# Patient Record
Sex: FEMALE | Race: WHITE | URBAN - METROPOLITAN AREA
[De-identification: names, ages, dates, MRNs, and addresses within clinical notes are randomized per-mention and may not be internally consistent; named-entity substitution may affect disease eponyms.]

---

## 2018-09-20 ENCOUNTER — OFFICE VISIT - HEALTHEAST (OUTPATIENT)
Dept: FAMILY MEDICINE | Facility: CLINIC | Age: 23
End: 2018-09-20

## 2018-09-20 DIAGNOSIS — Z00.00 HEALTH CARE MAINTENANCE: ICD-10-CM

## 2018-09-20 DIAGNOSIS — Z72.0 TOBACCO ABUSE: ICD-10-CM

## 2018-09-20 DIAGNOSIS — Z11.3 SCREEN FOR STD (SEXUALLY TRANSMITTED DISEASE): ICD-10-CM

## 2018-09-20 LAB
ANION GAP SERPL CALCULATED.3IONS-SCNC: 10 MMOL/L (ref 5–18)
BUN SERPL-MCNC: 10 MG/DL (ref 8–22)
CALCIUM SERPL-MCNC: 10.1 MG/DL (ref 8.5–10.5)
CHLORIDE BLD-SCNC: 103 MMOL/L (ref 98–107)
CHOLEST SERPL-MCNC: 200 MG/DL
CO2 SERPL-SCNC: 25 MMOL/L (ref 22–31)
CREAT SERPL-MCNC: 0.8 MG/DL (ref 0.6–1.1)
ERYTHROCYTE [DISTWIDTH] IN BLOOD BY AUTOMATED COUNT: 11.2 % (ref 11–14.5)
FASTING STATUS PATIENT QL REPORTED: YES
GFR SERPL CREATININE-BSD FRML MDRD: >60 ML/MIN/1.73M2
GLUCOSE BLD-MCNC: 93 MG/DL (ref 70–125)
HCT VFR BLD AUTO: 39.6 % (ref 35–47)
HDLC SERPL-MCNC: 73 MG/DL
HGB BLD-MCNC: 13.4 G/DL (ref 12–16)
HIV 1+2 AB+HIV1 P24 AG SERPL QL IA: NEGATIVE
LDLC SERPL CALC-MCNC: 109 MG/DL
MCH RBC QN AUTO: 31.4 PG (ref 27–34)
MCHC RBC AUTO-ENTMCNC: 33.9 G/DL (ref 32–36)
MCV RBC AUTO: 93 FL (ref 80–100)
PLATELET # BLD AUTO: 288 THOU/UL (ref 140–440)
PMV BLD AUTO: 8.2 FL (ref 7–10)
POTASSIUM BLD-SCNC: 4.2 MMOL/L (ref 3.5–5)
RBC # BLD AUTO: 4.27 MILL/UL (ref 3.8–5.4)
SODIUM SERPL-SCNC: 138 MMOL/L (ref 136–145)
TRIGL SERPL-MCNC: 90 MG/DL
TSH SERPL DL<=0.005 MIU/L-ACNC: 1.25 UIU/ML (ref 0.3–5)
WBC: 7.3 THOU/UL (ref 4–11)

## 2018-09-20 ASSESSMENT — MIFFLIN-ST. JEOR: SCORE: 1588.43

## 2018-09-21 LAB
25(OH)D3 SERPL-MCNC: 29.8 NG/ML (ref 30–80)
C TRACH DNA SPEC QL PROBE+SIG AMP: NEGATIVE
HAV IGM SERPL QL IA: NEGATIVE
HBV CORE IGM SERPL QL IA: NEGATIVE
HBV SURFACE AG SERPL QL IA: NEGATIVE
HCV AB SERPL QL IA: NEGATIVE
HPV SOURCE: ABNORMAL
HUMAN PAPILLOMA VIRUS 16 DNA: NEGATIVE
HUMAN PAPILLOMA VIRUS 18 DNA: NEGATIVE
HUMAN PAPILLOMA VIRUS FINAL DIAGNOSIS: ABNORMAL
HUMAN PAPILLOMA VIRUS OTHER HR: POSITIVE
N GONORRHOEA DNA SPEC QL NAA+PROBE: NEGATIVE
SPECIMEN DESCRIPTION: ABNORMAL
T PALLIDUM AB SER QL: NEGATIVE

## 2018-09-24 ENCOUNTER — COMMUNICATION - HEALTHEAST (OUTPATIENT)
Dept: PEDIATRICS | Facility: CLINIC | Age: 23
End: 2018-09-24

## 2018-10-01 LAB
BKR LAB AP ABNORMAL BLEEDING: NO
BKR LAB AP BIRTH CONTROL/HORMONES: NORMAL
BKR LAB AP CERVICAL APPEARANCE: NORMAL
BKR LAB AP GYN ADEQUACY: NORMAL
BKR LAB AP GYN INTERPRETATION: NORMAL
BKR LAB AP HPV REFLEX: NORMAL
BKR LAB AP LMP: NORMAL
BKR LAB AP PATIENT STATUS: NORMAL
BKR LAB AP PREVIOUS ABNORMAL: NORMAL
BKR LAB AP PREVIOUS NORMAL: 2011
HIGH RISK?: NO
PATH REPORT.COMMENTS IMP SPEC: NORMAL
RESULT FLAG (HE HISTORICAL CONVERSION): NORMAL

## 2018-10-03 ENCOUNTER — COMMUNICATION - HEALTHEAST (OUTPATIENT)
Dept: FAMILY MEDICINE | Facility: CLINIC | Age: 23
End: 2018-10-03

## 2018-11-17 ENCOUNTER — OFFICE VISIT (OUTPATIENT)
Dept: URGENT CARE | Facility: URGENT CARE | Age: 23
End: 2018-11-17
Payer: COMMERCIAL

## 2018-11-17 VITALS
DIASTOLIC BLOOD PRESSURE: 66 MMHG | BODY MASS INDEX: 28.12 KG/M2 | HEIGHT: 66 IN | TEMPERATURE: 98.5 F | RESPIRATION RATE: 14 BRPM | HEART RATE: 90 BPM | WEIGHT: 175 LBS | SYSTOLIC BLOOD PRESSURE: 118 MMHG

## 2018-11-17 DIAGNOSIS — R07.0 THROAT PAIN: Primary | ICD-10-CM

## 2018-11-17 LAB
DEPRECATED S PYO AG THROAT QL EIA: NORMAL
SPECIMEN SOURCE: NORMAL

## 2018-11-17 PROCEDURE — 99202 OFFICE O/P NEW SF 15 MIN: CPT | Performed by: FAMILY MEDICINE

## 2018-11-17 PROCEDURE — 87081 CULTURE SCREEN ONLY: CPT | Performed by: FAMILY MEDICINE

## 2018-11-17 PROCEDURE — 87880 STREP A ASSAY W/OPTIC: CPT | Performed by: FAMILY MEDICINE

## 2018-11-17 RX ORDER — AMOXICILLIN 500 MG/1
500 CAPSULE ORAL 3 TIMES DAILY
Qty: 30 CAPSULE | Refills: 0 | Status: SHIPPED | OUTPATIENT
Start: 2018-11-17

## 2018-11-17 NOTE — PROGRESS NOTES
Subjective: Patient is leaving for hospitals tomorrow, yesterday morning suddenly got sick with a sore throat and fevers and feeling exhausted.  No cold symptoms.  She has never had mono.    Objective: ENT with red and exudative tonsils.  Anterior nodes are present and painful on the right.  Lungs are clear.  Heart is regular without murmurs.  Abdomen benign without rash.  Strep test negative    Assessment and plan: This feels just like strep to her and she has all the classic symptoms and yet the test is negative.  It may be a false negative.  It is complicated by the fact that she is leaving for hospitals for 2 weeks.  She will sign up for MY CHART, so she can see the results of the culture tomorrow or the next day and will start her on amoxicillin pending culture but she will stop it if the test is negative in the culture.

## 2018-11-17 NOTE — MR AVS SNAPSHOT
"              After Visit Summary   11/17/2018    Ema Lynn    MRN: [id number]           Patient Information     Date Of Birth          1995        Visit Information        Provider Department      11/17/2018 9:25 AM Isaiah Riddle MD [facility]        Today's Diagnoses     Throat pain    -  1       Follow-ups after your visit        Who to contact     If you have questions or need follow up information about today's clinic visit or your schedule please contact [facility] directly at [phone].  Normal or non-critical lab and imaging results will be communicated to you by [employer], letter or phone within 4 business days after the clinic has received the results. If you do not hear from us within 7 days, please contact the clinic through [employer] or phone. If you have a critical or abnormal lab result, we will notify you by phone as soon as possible.  Submit refill requests through [employer] or call your pharmacy and they will forward the refill request to us. Please allow 3 business days for your refill to be completed.          Additional Information About Your Visit        MyChart Information     [employer] gives you secure access to your electronic health record. If you see a primary care provider, you can also send messages to your care team and make appointments. If you have questions, please call your primary care clinic.  If you do not have a primary care provider, please call [phone] and they will assist you.        Care EveryWhere ID     This is your Care EveryWhere ID. This could be used by other organizations to access your [facility] medical records  [id number]        Your Vitals Were     Pulse Temperature Respirations Height Last Period Breastfeeding?    90 98.5  F (36.9  C) (Oral) 14 5' 6\" (1.676 m) 11/14/2018 No    BMI (Body Mass Index)                   28.25 kg/m2            Blood Pressure from Last 3 Encounters:   11/17/18 118/66    " Weight from Last 3 Encounters:   11/17/18 175 lb (79.4 kg)              We Performed the Following     Beta strep group A culture     Strep, Rapid Screen          Today's Medication Changes          These changes are accurate as of 11/17/18 10:52 AM.  If you have any questions, ask your nurse or doctor.               Start taking these medicines.        Dose/Directions    amoxicillin 500 MG capsule   Commonly known as:  AMOXIL   Used for:  Throat pain   Started by:  Isaiah Riddle MD        Dose:  500 mg   Take 1 capsule (500 mg) by mouth 3 times daily   Quantity:  30 capsule   Refills:  0            Where to get your medicines      These medications were sent to [facility] - [place], [place] - [address]  [address] [facility] [place] [address]     Phone:  [phone]     amoxicillin 500 MG capsule                Primary Care Provider Fax #    Physician No Ref-Primary [phone]       No address on file        Equal Access to Services     BALJIT MASON : Hadstephy wang Sostefani, waaxda luqadaha, qaybta kaalmada adecornellyada, hilary pollard . So [facility] [phone].    ATENCIÓN: Si habla español, tiene a hannon disposición servicios gratuitos de asistencia lingüística. Llame al [phone].    We comply with applicable federal civil rights laws and [place] laws. We do not discriminate on the basis of race, color, national origin, age, disability, sex, sexual orientation, or gender identity.            Thank you!     Thank you for choosing [facility]  for your care. Our goal is always to provide you with excellent care. Hearing back from our patients is one way we can continue to improve our services. Please take a few minutes to complete the written survey that you may receive in the mail after your visit with us. Thank you!             Your Updated Medication List - Protect others around you: Learn how to safely use, store and throw away  your medicines at www.disposemymeds.org.          This list is accurate as of 11/17/18 10:52 AM.  Always use your most recent med list.                   Brand Name Dispense Instructions for use Diagnosis    amoxicillin 500 MG capsule    AMOXIL    30 capsule    Take 1 capsule (500 mg) by mouth 3 times daily    Throat pain

## 2018-11-18 LAB
BACTERIA SPEC CULT: NORMAL
SPECIMEN SOURCE: NORMAL

## 2018-11-20 ENCOUNTER — HEALTH MAINTENANCE LETTER (OUTPATIENT)
Age: 23
End: 2018-11-20

## 2019-01-02 ENCOUNTER — COMMUNICATION - HEALTHEAST (OUTPATIENT)
Dept: SCHEDULING | Facility: CLINIC | Age: 24
End: 2019-01-02

## 2019-01-02 ENCOUNTER — OFFICE VISIT - HEALTHEAST (OUTPATIENT)
Dept: FAMILY MEDICINE | Facility: CLINIC | Age: 24
End: 2019-01-02

## 2019-01-02 DIAGNOSIS — N39.0 ACUTE URINARY TRACT INFECTION: ICD-10-CM

## 2019-01-02 LAB
ALBUMIN UR-MCNC: ABNORMAL MG/DL
APPEARANCE UR: ABNORMAL
BACTERIA #/AREA URNS HPF: ABNORMAL HPF
BILIRUB UR QL STRIP: NEGATIVE
COLOR UR AUTO: ABNORMAL
GLUCOSE UR STRIP-MCNC: NEGATIVE MG/DL
HGB UR QL STRIP: ABNORMAL
KETONES UR STRIP-MCNC: NEGATIVE MG/DL
LEUKOCYTE ESTERASE UR QL STRIP: ABNORMAL
MUCOUS THREADS #/AREA URNS LPF: ABNORMAL LPF
NITRATE UR QL: NEGATIVE
PH UR STRIP: 5.5 [PH] (ref 5–8)
RBC #/AREA URNS AUTO: >100 HPF
SP GR UR STRIP: >=1.03 (ref 1–1.03)
SQUAMOUS #/AREA URNS AUTO: ABNORMAL LPF
UROBILINOGEN UR STRIP-ACNC: ABNORMAL
WBC #/AREA URNS AUTO: ABNORMAL HPF

## 2019-01-02 ASSESSMENT — MIFFLIN-ST. JEOR: SCORE: 1596.14

## 2019-01-05 ENCOUNTER — AMBULATORY - HEALTHEAST (OUTPATIENT)
Dept: FAMILY MEDICINE | Facility: CLINIC | Age: 24
End: 2019-01-05

## 2019-01-05 LAB
BACTERIA SPEC CULT: ABNORMAL
BACTERIA SPEC CULT: ABNORMAL

## 2019-01-07 ENCOUNTER — COMMUNICATION - HEALTHEAST (OUTPATIENT)
Dept: FAMILY MEDICINE | Facility: CLINIC | Age: 24
End: 2019-01-07

## 2019-09-13 ENCOUNTER — OFFICE VISIT - HEALTHEAST (OUTPATIENT)
Dept: FAMILY MEDICINE | Facility: CLINIC | Age: 24
End: 2019-09-13

## 2019-09-13 DIAGNOSIS — L72.3 SEBACEOUS CYST: ICD-10-CM

## 2019-09-13 DIAGNOSIS — L73.2 HYDRADENITIS: ICD-10-CM

## 2020-03-10 ENCOUNTER — HEALTH MAINTENANCE LETTER (OUTPATIENT)
Age: 25
End: 2020-03-10

## 2020-12-27 ENCOUNTER — HEALTH MAINTENANCE LETTER (OUTPATIENT)
Age: 25
End: 2020-12-27

## 2021-04-24 ENCOUNTER — HEALTH MAINTENANCE LETTER (OUTPATIENT)
Age: 26
End: 2021-04-24

## 2021-06-01 VITALS — BODY MASS INDEX: 28.33 KG/M2 | WEIGHT: 180.5 LBS | HEIGHT: 67 IN

## 2021-06-01 NOTE — PATIENT INSTRUCTIONS - HE
Could get pregnant - if not using birth control then you should take daily prenatal vitamin     Cyst  -looks to be healing well  -infection is resolving - normal fluid now  -reasonable to let this heal and if it ever comes back again then we could discuss plan for surgical removal    Armpits - looks like hydradenitis vs folliculitis  -will treat with topical clindamycin (antibiotic) two times a day for 3 months

## 2021-06-01 NOTE — PROGRESS NOTES
Assessment/Plan:    1. Hydradenitis  Current appearance of axillary rash looks more like folliculitis but described prior appearance sounds like hydradenitis. Will treat with clindamycin solution two times a day for 3 months - if not working would try alternative treatment.  - clindamycin (CLEOCIN T) 1 % external solution; Apply to affected area 2 times daily (armpits)  Dispense: 60 mL; Refill: 1    2. Sebaceous cyst  Popped sebaceous cyst on right upper arm without evidence of infection at this time. No need for antibiotics at this time and would not recommend excision currently. Discussed letting current lesion heal and if ever occurs again we could discuss plan for excision in the future.      Follow up: as needed    Michelle Ramirez MD  [facility]    Subjective:    Patient ID: Ema Lynn is a 24 y.o. female is here today for cyst, rash    Rash, cyst  -in Feb woke up and noticed inflammation/irritation under her armpits - hasn't really gone away since then  -feels like hair follicles are infected - gets whiteheads/boils  -worse with heat/humidity  -has tried apple cider vinegar - unsure if helpful; also tried to wear loose clothes  -no fever or feeling ill though has been feeling fatigued  -a few days ago had a cyst pop on her R lateral arm - initially with lots of pus but now just clear fluid - not very painful - wondering if it needs treatment      Patient Active Problem List   Diagnosis     Vitamin D deficiency     History of HPV infection     History reviewed. No pertinent surgical history.  No current outpatient medications on file prior to visit.     No current facility-administered medications on file prior to visit.      No Known Allergies  Social History     Socioeconomic History     Marital status: Single     Spouse name: Not on file     Number of children: 0     Years of education: Not on file     Highest education level: Not on file   Occupational History     Occupation:  /   Social Needs     Financial resource strain: Not on file     Food insecurity:     Worry: Not on file     Inability: Not on file     Transportation needs:     Medical: Not on file     Non-medical: Not on file   Tobacco Use     Smoking status: Former Smoker     Types: Cigarettes     Smokeless tobacco: Never Used   Substance and Sexual Activity     Alcohol use: Yes     Frequency: 4 or more times a week     Drinks per session: 1 or 2     Binge frequency: Never     Comment: 8-10 drinks/week     Drug use: No     Sexual activity: Yes     Partners: Male     Birth control/protection: None   Lifestyle     Physical activity:     Days per week: Not on file     Minutes per session: Not on file     Stress: Not on file   Relationships     Social connections:     Talks on phone: Not on file     Gets together: Not on file     Attends Caodaism service: Not on file     Active member of club or organization: Not on file     Attends meetings of clubs or organizations: Not on file     Relationship status: Not on file     Intimate partner violence:     Fear of current or ex partner: Not on file     Emotionally abused: Not on file     Physically abused: Not on file     Forced sexual activity: Not on file   Other Topics Concern     Not on file   Social History Narrative     Not on file     Family History   Problem Relation Age of Onset     Heart disease Mother      Other Mother         HPV positive     Asthma Mother      Diabetes Father         type 2     Polycystic ovary syndrome Sister      ADD / ADHD Brother      Polycystic ovary syndrome Sister      Review of systems is as stated in HPI, and the remainder of system review is otherwise negative.    Objective:      /60   Pulse 81   Wt 212 lb 5 oz (96.3 kg)   LMP 07/31/2019 (Approximate)   BMI 33.75 kg/m      General appearance: awake, NAD, obese  HEENT: atraumatic, normocephalic, no scleral icterus or injection, ears and nose grossly normal, moist mucous  membranes  CV: RRR, no murmurs/rubs/gallops, normal S1 and S2  Lungs: CTAB, no wheezes or crackles, breathing comfortably on room air  Extremities: moving all extremities  Skin: open lesion on right lateral upper extremity without surrounding erythema - expressed tiny amount of serous fluid; bilateral axillae with small pustules around hair follicles  Neuro: alert, oriented x3, CNs grossly intact, no focal deficits appreciated  Psych: normal mood/affect/behavior, answering questions appropriately, linear thought process

## 2021-06-02 VITALS — HEIGHT: 67 IN | BODY MASS INDEX: 28.6 KG/M2 | WEIGHT: 182.2 LBS

## 2021-06-03 VITALS
SYSTOLIC BLOOD PRESSURE: 110 MMHG | WEIGHT: 212.31 LBS | HEART RATE: 81 BPM | BODY MASS INDEX: 33.75 KG/M2 | DIASTOLIC BLOOD PRESSURE: 60 MMHG

## 2021-06-16 PROBLEM — Z86.19 HISTORY OF HPV INFECTION: Status: ACTIVE | Noted: 2019-09-13

## 2021-06-16 PROBLEM — E55.9 VITAMIN D DEFICIENCY: Status: ACTIVE | Noted: 2019-09-13

## 2021-06-20 NOTE — PROGRESS NOTES
FEMALE PREVENTATIVE EXAM    Assessment and Plan:     1. Health care maintenance  Fasting labs ordered,   Completed pap smear, c/g along with STD screen  Reviewed safe sexual practices.   We discussed healthy lifestyle, nutrition, cardiovascular risk reduction, self care, safety, self breast exams, sunscreen, and seatbelt screening.  Recommended annual physical exam or sooner for any acute concerns.   Patient agreed and appeared pleased with plan  - Gynecologic Cytology (PAP Smear)  - Chlamydia trachomatis & Neisseria gonorrhoeae, Amplified Detection  - Lipid Cascade  - Vitamin D, Total (25-Hydroxy)  - HM2(CBC w/o Differential)  - Basic Metabolic Panel  - Thyroid Hayes    2. Screen for STD (sexually transmitted disease)  - HIV Antigen/Antibody Screening Hayes  - Syphilis Screen, Cascade  - Hepatitis Acute Evaluation    3. Tobacco Abuse  Education completed today, recommended that she do avoid smoking.     Immunization Review  Adult Imm Review: No immunizations due today  Due today, orders placed    I discussed the following with the patient:   Adult Healthy Living: Importance of regular exercise  Healthy nutrition  Getting adequate sleep  Stress management  Use of seat belts  Distracted driving      Subjective:   Chief Complaint: Ema Lynn is an 23 y.o. female here for a preventative health visit.     HPI:      She is taking birth control, Dee Dee and started this two weeks ago during her menses.   She did order this online. Denies any side effects,   She is a smoker  Denies personal or family hx of migraines, clotting, bleeding or thromboembolic disorders.   She is sexually active.   Does not need refill today    Due for pap smear, last age 16 and normal.   Fasting today    She is requiring STD testing,   Denies  any symptoms - vaginal discharge, pain, ulcers/lesion, fever, chills    She is a current smoker,   Denies any desire to quit.     Healthy Habits  Are you taking a daily aspirin? No  Do you typically  "exercising at least 40 min, 3-4 times per week?  NO  Do you usually eat at least 4 servings of fruit and vegetables a day, include whole grains and fiber and avoid regularly eating high fat foods? NO  Have you had an eye exam in the past two years? NO  Do you see a dentist twice per year? Yes  Do you have any concerns regarding sleep? No    Safety Screen  If you own firearms, are they secured in a locked gun cabinet or with trigger locks? The patient does not own any firearms  Do you feel you are safe where you are living?: Yes (9/20/2018 11:07 AM)  Do you feel you are safe in your relationship(s)?: Yes (9/20/2018 11:07 AM)    Review of Systems:  Please see above.  The rest of the review of systems are negative for all systems.     Cancer Screening       Status Date      PAP SMEAR Overdue 8/22/2016           History     Reviewed By Date/Time Sections Reviewed    Heidy Pickering NP 9/20/2018 11:22 AM Maria Del Rosario Girard LPN 9/20/2018 11:06 AM Tobacco, Alcohol, Drug Use, Sexual Activity            Objective:   Vital Signs:   Visit Vitals     /72 (Patient Site: Left Arm, Patient Position: Sitting, Cuff Size: Adult Regular)     Pulse 88     Ht 5' 6.5\" (1.689 m)     Wt 180 lb 8 oz (81.9 kg)     LMP 08/26/2018 (Exact Date)     Breastfeeding No     BMI 28.7 kg/m2          PHYSICAL EXAM  Constitutional: Alert and oriented x3, well nourished without any acute distress  HENT:   Right Ear: External ear normal.   Left Ear: External ear normal.   Nose: Nose normal.   Mouth/Throat: Oropharynx is clear and moist.   Eyes: Conjunctivae and EOM are normal. Pupils are equal, round, and reactive to light. Right eye exhibits no discharge. Left eye exhibits no discharge.   Neck: No thyromegaly present.   Lymphadenopathy: Without palpable lymphadenopathy  Cardiovascular: Normal S1 and S2. Regular rate, rhythm and no murmur, rubs or gallops. Palpable distal pulses bilaterally.  Pulmonary/Chest: Normal effort. " "Lungs clear to auscultation in all lobes. Without wheezing, rhonchi or crackles.    Abdominal: Soft, non tenderness. There is no rebound or guarding. Bowel sounds x4. Without organomegaly. No CVA tenderness.  Musculoskeletal: 5/5 strength  And full ROM in all extremities. No joint swelling or deformity  Neurological: Cranial nerves intact, without deficit. Without numbness, tingling or paresthesia. Normal reflexes  Skin: Dry and intact; without rashes or lesions.   Psychiatric: Normal mood and affect.   : External female genitalia without lesions. Introitus is normal, vaginal walls pink and moist without lesions. There is no cervical motion tenderness and the adnexa are without masses. There is no abnormal discharge from the cervix.   Breast: No chest deformity, asymmetry. Normal contours. Without nodules, masses, tenderness, or axillary adenopathy. No nipple discharge or dimpling noted.       Objective:     Physical Exam   /72 (Patient Site: Left Arm, Patient Position: Sitting, Cuff Size: Adult Regular)  Pulse 88  Ht 5' 6.5\" (1.689 m)  Wt 180 lb 8 oz (81.9 kg)  LMP 08/26/2018 (Exact Date)  Breastfeeding? No  BMI 28.7 kg/m2    Constitutional: Alert and oriented x3, well nourished without any acute distress  HENT:   Right Ear: External ear normal.   Left Ear: External ear normal.   Nose: Nose normal.   Mouth/Throat: Oropharynx is clear and moist.   Eyes: Conjunctivae and EOM are normal. Pupils are equal, round, and reactive to light. Right eye exhibits no discharge. Left eye exhibits no discharge.   Neck: No thyromegaly present.   Lymphadenopathy: Without palpable lymphadenopathy  Cardiovascular: Normal S1 and S2. Regular rate, rhythm and no murmur, rubs or gallops. Palpable distal pulses bilaterally.  Pulmonary/Chest: Normal effort. Lungs clear to auscultation in all lobes. Without wheezing, rhonchi or crackles.    Abdominal: Soft, non tenderness. There is no rebound or guarding. Bowel sounds x4. " Without organomegaly. No CVA tenderness.  Musculoskeletal: 5/5 strength  And full ROM in all extremities. No joint swelling or deformity  Neurological: Cranial nerves intact, without deficit. Without numbness, tingling or paresthesia. Normal reflexes  Skin: Dry and intact; without rashes or lesions.   Psychiatric: Normal mood and affect.   : External female genitalia without lesions. Introitus is normal, vaginal walls pink and moist without lesions. There is no cervical motion tenderness and the adnexa are without masses. There is no abnormal discharge from the cervix.   Breast: No chest deformity, asymmetry. Normal contours. Without nodules, masses, tenderness, or axillary adenopathy. No nipple discharge or dimpling noted.

## 2021-06-22 NOTE — PROGRESS NOTES
Assessment/Plan:     1. Acute urinary tract infection  Prescription provided for nitrofurantoin twice daily for 5 days.  Advised follow-up visit with persistence of symptoms or onset of additional symptoms.  Will await culture as well to confirm susceptibility.  Please note that hematuria present however patient currently has menses which may be throwing off results.  - Urinalysis Macroscopic  - Urine,Microscopic  - Culture, Urine      There are no Patient Instructions on file for this visit.     No Follow-up on file.      Subjective:      Ema Lynn is a 23 y.o. female presented to clinic today for evaluation of dysuria present for the past 10 days or so.  Notes primarily and dysuria and cloudy urine.  Notes that she currently has her menses.  Has had some mild vaginal discharge and also noted mild odor with urination, some low back pain, no CVA pain, fevers, chills, or new sexual partners.  Remains on oral contraceptive pills.  Denies risk of pregnancy currently.    Current Outpatient Medications   Medication Sig Dispense Refill     drospirenone-ethinyl estradiol (LLOYD) 3-0.02 mg per tablet Take 1 tablet by mouth daily.       nitrofurantoin, macrocrystal-monohydrate, (MACROBID) 100 MG capsule Take 1 capsule (100 mg total) by mouth 2 (two) times a day for 5 days. 10 capsule 0     No current facility-administered medications for this visit.        Past Medical History, Family History, and Social History reviewed.  No past medical history on file.  No past surgical history on file.  Patient has no known allergies.  Family History   Problem Relation Age of Onset     Heart disease Mother      Diabetes Father      Social History     Socioeconomic History     Marital status: Single     Spouse name: Not on file     Number of children: 0     Years of education: Not on file     Highest education level: Not on file   Social Needs     Financial resource strain: Not on file     Food insecurity - worry: Not on file      "Food insecurity - inability: Not on file     Transportation needs - medical: Not on file     Transportation needs - non-medical: Not on file   Occupational History     Occupation: /   Tobacco Use     Smoking status: Current Every Day Smoker     Packs/day: 0.25     Years: 0.25     Pack years: 0.06     Types: Cigarettes     Smokeless tobacco: Never Used   Substance and Sexual Activity     Alcohol use: Yes     Comment: 10/week     Drug use: No     Sexual activity: Yes     Partners: Male     Birth control/protection: OCP   Other Topics Concern     Not on file   Social History Narrative     Not on file         Review of systems is as stated in HPI, and the remainder of the 10 system review is otherwise negative.    Objective:     Vitals:    01/02/19 1534   BP: 122/80   Patient Site: Right Arm   Patient Position: Sitting   Cuff Size: Adult Large   Pulse: 77   Resp: 20   Temp: 98  F (36.7  C)   TempSrc: Oral   SpO2: 98%   Weight: 182 lb 3.2 oz (82.6 kg)   Height: 5' 6.5\" (1.689 m)    Body mass index is 28.97 kg/m .    Alert female.  Abdomen is soft.  Very minimal suprapubic discomfort.  No CVA tenderness.    Office Visit on 01/02/2019   Component Date Value Ref Range Status     Color, UA 01/02/2019 Beronica* Colorless, Yellow, Straw, Light Yellow Final     Clarity, UA 01/02/2019 Cloudy* Clear Final     Glucose, UA 01/02/2019 Negative  Negative Final     Bilirubin, UA 01/02/2019 Negative  Negative Final     Ketones, UA 01/02/2019 Negative  Negative Final     Specific Gravity, UA 01/02/2019 >=1.030  1.005 - 1.030 Final     Blood, UA 01/02/2019 Large* Negative Final     pH, UA 01/02/2019 5.5  5.0 - 8.0 Final     Protein, UA 01/02/2019 Trace* Negative mg/dL Final     Urobilinogen, UA 01/02/2019 0.2 E.U./dL  0.2 E.U./dL, 1.0 E.U./dL Final     Nitrite, UA 01/02/2019 Negative  Negative Final     Leukocytes, UA 01/02/2019 Trace* Negative Final          This note has been dictated using voice recognition software. Any " grammatical or context distortions are unintentional and inherent to the the software.

## 2021-06-22 NOTE — TELEPHONE ENCOUNTER
Patient calling. She is reporting she has had frequency, burning and urgency with urination. Also, she is c/o cloudy urine.  Patient reports she has been drinking cranberry juice.  Patient was advised  to be seen today for possible UTI.    An appointment was made for patient to be seen at [facility] today.    Christi Mcknight RN  [facility] Triage/refill nurse    Reason for Disposition    Painful urination AND EITHER frequency or urgency    Protocols used: URINATION PAIN - FEMALE-A-OH

## 2021-06-22 NOTE — TELEPHONE ENCOUNTER
----- Message from Michaelle Manzo MD sent at 1/5/2019 10:35 AM CST -----  I attempted to call patient, left voicemail.  Infection is resistant to nitrofurantoin.  I have sent a new prescription for ciprofloxacin instead.

## 2021-10-04 ENCOUNTER — HEALTH MAINTENANCE LETTER (OUTPATIENT)
Age: 26
End: 2021-10-04

## 2022-05-15 ENCOUNTER — HEALTH MAINTENANCE LETTER (OUTPATIENT)
Age: 27
End: 2022-05-15

## 2022-09-11 ENCOUNTER — HEALTH MAINTENANCE LETTER (OUTPATIENT)
Age: 27
End: 2022-09-11